# Patient Record
Sex: MALE | Race: WHITE | NOT HISPANIC OR LATINO | URBAN - METROPOLITAN AREA
[De-identification: names, ages, dates, MRNs, and addresses within clinical notes are randomized per-mention and may not be internally consistent; named-entity substitution may affect disease eponyms.]

---

## 2022-02-01 ENCOUNTER — EMERGENCY (EMERGENCY)
Facility: HOSPITAL | Age: 27
LOS: 1 days | Discharge: HOME | End: 2022-02-01
Attending: EMERGENCY MEDICINE | Admitting: EMERGENCY MEDICINE
Payer: COMMERCIAL

## 2022-02-01 VITALS
TEMPERATURE: 98 F | HEART RATE: 81 BPM | DIASTOLIC BLOOD PRESSURE: 89 MMHG | RESPIRATION RATE: 18 BRPM | SYSTOLIC BLOOD PRESSURE: 143 MMHG | OXYGEN SATURATION: 100 %

## 2022-02-01 PROCEDURE — 73130 X-RAY EXAM OF HAND: CPT | Mod: 26,RT

## 2022-02-01 PROCEDURE — 99284 EMERGENCY DEPT VISIT MOD MDM: CPT

## 2022-02-01 RX ORDER — IBUPROFEN 200 MG
1 TABLET ORAL
Qty: 15 | Refills: 0
Start: 2022-02-01

## 2022-02-01 RX ORDER — IBUPROFEN 200 MG
600 TABLET ORAL ONCE
Refills: 0 | Status: COMPLETED | OUTPATIENT
Start: 2022-02-01 | End: 2022-02-01

## 2022-02-01 RX ADMIN — Medication 600 MILLIGRAM(S): at 23:35

## 2022-02-01 NOTE — ED PROVIDER NOTE - PATIENT PORTAL LINK FT
You can access the FollowMyHealth Patient Portal offered by [facility] by registering at the following website: [email or web address]. By joining [facility]’s FollowMyHealth portal, you will also be able to view your health information using other applications (apps) compatible with our system.

## 2022-02-01 NOTE — ED PROVIDER NOTE - NSFOLLOWUPINSTRUCTIONS_ED_ALL_ED_FT
Take ibuprofen every 8 hours for moderate pain  Take percocet every 8 hours for severe pain.  Do not take percocet with tylenol    Follow up with a hand surgeon within 1 week.

## 2022-02-01 NOTE — ED PROVIDER NOTE - OBJECTIVE STATEMENT
26 year old with trauma to right 5th digit. struck while playing basketball.  obvious deformity noted. no other injuries.  sensation intact to distal finger.

## 2022-02-01 NOTE — ED ADULT TRIAGE NOTE - CHIEF COMPLAINT QUOTE
pt c/o R pinky injury s/p jamming finger while playing basketball. Pt noted with deformity to R paul

## 2022-02-01 NOTE — ED PROVIDER NOTE - CLINICAL SUMMARY MEDICAL DECISION MAKING FREE TEXT BOX
concern for fracture/dislocation to right 5th digit. digital block for pain. xray oral pain control splint hand fu

## 2023-01-01 NOTE — ED ADULT NURSE NOTE - CHIEF COMPLAINT QUOTE
emerging
pt c/o R pinky injury s/p jamming finger while playing basketball. Pt noted with deformity to R paul

## 2023-01-15 ENCOUNTER — NON-APPOINTMENT (OUTPATIENT)
Age: 28
End: 2023-01-15

## 2023-03-08 NOTE — ED ADULT TRIAGE NOTE - WILL THE PATIENT ACCEPT THE PFIZER COVID-19 VACCINE IF ELIGIBLE AND IT IS AVAILABLE?
Detail Level: Generalized No Detail Level: Detailed Sunscreen Recommendations: Recommended SPF 30+ reapplied every 2 hours Detail Level: Zone

## 2023-05-10 PROBLEM — Z00.00 ENCOUNTER FOR PREVENTIVE HEALTH EXAMINATION: Status: ACTIVE | Noted: 2023-05-10

## 2023-11-14 ENCOUNTER — OFFICE (OUTPATIENT)
Facility: LOCATION | Age: 28
Setting detail: OPHTHALMOLOGY
End: 2023-11-14
Payer: COMMERCIAL

## 2023-11-14 DIAGNOSIS — H01.001: ICD-10-CM

## 2023-11-14 DIAGNOSIS — H01.004: ICD-10-CM

## 2023-11-14 DIAGNOSIS — H01.002: ICD-10-CM

## 2023-11-14 DIAGNOSIS — H01.005: ICD-10-CM

## 2023-11-14 DIAGNOSIS — H52.13: ICD-10-CM

## 2023-11-14 PROBLEM — H52.223 ASTIGMATISM, REGULAR; BOTH EYES: Status: ACTIVE | Noted: 2023-11-14

## 2023-11-14 PROCEDURE — 92004 COMPRE OPH EXAM NEW PT 1/>: CPT | Performed by: OPHTHALMOLOGY

## 2023-11-14 PROCEDURE — 92015 DETERMINE REFRACTIVE STATE: CPT | Performed by: OPHTHALMOLOGY

## 2023-11-14 ASSESSMENT — REFRACTION_AUTOREFRACTION
OD_AXIS: 006
OD_SPHERE: -2.25
OS_AXIS: 179
OS_SPHERE: -3.25
OS_CYLINDER: -3.00
OD_CYLINDER: -3.50

## 2023-11-14 ASSESSMENT — LID EXAM ASSESSMENTS
OD_BLEPHARITIS: RLL RUL 2+
OS_BLEPHARITIS: LLL LUL 2+

## 2023-11-14 ASSESSMENT — REFRACTION_MANIFEST
OD_AXIS: 005
OS_VA1: 20/20
OD_VA1: 20/20
OD_CYLINDER: -3.50
OS_CYLINDER: -3.00
OS_SPHERE: -3.25
OS_AXIS: 180
OD_SPHERE: -2.25

## 2023-11-14 ASSESSMENT — REFRACTION_CURRENTRX
OS_OVR_VA: 20/
OD_OVR_VA: 20/
OD_AXIS: 9
OS_AXIS: 176
OS_SPHERE: -2.75
OD_SPHERE: -1.75
OD_CYLINDER: -4.00
OS_CYLINDER: -3.50

## 2023-11-14 ASSESSMENT — SPHEQUIV_DERIVED
OD_SPHEQUIV: -4
OS_SPHEQUIV: -4.75
OS_SPHEQUIV: -4.75
OD_SPHEQUIV: -4

## 2024-07-23 ENCOUNTER — APPOINTMENT (OUTPATIENT)
Dept: ORTHOPEDIC SURGERY | Facility: CLINIC | Age: 29
End: 2024-07-23
Payer: COMMERCIAL

## 2024-07-23 VITALS — WEIGHT: 210 LBS | BODY MASS INDEX: 24.3 KG/M2 | HEIGHT: 78 IN

## 2024-07-23 DIAGNOSIS — M70.51 OTHER BURSITIS OF KNEE, RIGHT KNEE: ICD-10-CM

## 2024-07-23 DIAGNOSIS — Z78.9 OTHER SPECIFIED HEALTH STATUS: ICD-10-CM

## 2024-07-23 PROCEDURE — 99203 OFFICE O/P NEW LOW 30 MIN: CPT

## 2024-07-23 PROCEDURE — 73564 X-RAY EXAM KNEE 4 OR MORE: CPT | Mod: RT

## 2024-07-23 NOTE — HISTORY OF PRESENT ILLNESS
[6] : 6 [Dull/Aching] : dull/aching [Constant] : constant [Nothing helps with pain getting better] : Nothing helps with pain getting better [de-identified] : 27 yo M presenting with right knee pain. Plays football; basketball; golf., played at [employer] Started about 2 weeks ago. Feels tender; worse with movement. No trauma or injury. No previous injury/problem with right knee. On no meds, worse when jumps and single leg partial squat [] : no [FreeTextEntry1] : R. knee

## 2024-07-23 NOTE — DISCUSSION/SUMMARY
[de-identified] : modify activities use elastic sleeve for structural support try OTC meds ice as needed try topical lidocaine for pain control reviewed current medications used by this patient home exercises for functional return  RE:  AALIYAH CAMEJO   Acct #- [id number]   Attention:  Nurse Reviewer /Medical Director    Based on my patient's condition, I strongly believe that the MRI R knee is medically.necessary.   The patient has failed oral meds, injections and PT and conservative treatment in combination or by themselves and therefore needs the MRI.   The MRI will dictate further treatment t recommendations.

## 2024-07-23 NOTE — PHYSICAL EXAM
[5___] : quadriceps 5[unfilled]/5 [] : not mildly antalgic [Right] : right knee [All Views] : anteroposterior, lateral, skyline, and anteroposterior standing [There are no fractures, subluxations or dislocations. No significant abnormalities are seen] : There are no fractures, subluxations or dislocations. No significant abnormalities are seen

## 2024-07-29 ENCOUNTER — APPOINTMENT (OUTPATIENT)
Dept: MRI IMAGING | Facility: CLINIC | Age: 29
End: 2024-07-29
Payer: COMMERCIAL

## 2024-07-29 PROCEDURE — 73721 MRI JNT OF LWR EXTRE W/O DYE: CPT

## 2024-08-12 ENCOUNTER — APPOINTMENT (OUTPATIENT)
Dept: ORTHOPEDIC SURGERY | Facility: CLINIC | Age: 29
End: 2024-08-12

## 2024-08-12 VITALS — WEIGHT: 210 LBS | BODY MASS INDEX: 24.3 KG/M2 | HEIGHT: 78 IN

## 2024-08-12 DIAGNOSIS — M70.51 OTHER BURSITIS OF KNEE, RIGHT KNEE: ICD-10-CM

## 2024-08-12 PROCEDURE — 99213 OFFICE O/P EST LOW 20 MIN: CPT

## 2024-08-12 RX ORDER — MELOXICAM 15 MG/1
15 TABLET ORAL
Qty: 30 | Refills: 0 | Status: ACTIVE | COMMUNITY
Start: 2024-08-12 | End: 1900-01-01

## 2024-08-12 NOTE — DATA REVIEWED
[MRI] : MRI [Right] : of the right [Knee] : knee [Report was reviewed and noted in the chart] : The report was reviewed and noted in the chart [I reviewed the films/CD and agree] : I reviewed the films/CD and agree [FreeTextEntry1] : no meiscal or ligamentous tears.

## 2024-08-12 NOTE — HISTORY OF PRESENT ILLNESS
[Gradual] : gradual [6] : 6 [4] : 4 [Dull/Aching] : dull/aching [Constant] : constant [Rest] : rest [Meds] : meds [Nothing helps with pain getting better] : Nothing helps with pain getting better [Stairs] : stairs [de-identified] : Plays football; basketball; golf., played at [email or web address] Started about 5 weeks ago. Feels tender; worse with movement.  Underwent recent MRI evaluation. Certain motions make him more symptomatic [] : no [FreeTextEntry1] : RT KNEE  [de-identified] : MRI

## 2024-08-12 NOTE — DISCUSSION/SUMMARY
[de-identified] : modify activities use elastic sleeve for structural support try OTC meds ice as needed try topical lidocaine for pain control reviewed current medications used by this patient home exercises for functional return poss csi in future

## 2024-12-13 ENCOUNTER — NON-APPOINTMENT (OUTPATIENT)
Age: 29
End: 2024-12-13